# Patient Record
Sex: FEMALE | Race: WHITE | NOT HISPANIC OR LATINO | Employment: PART TIME | URBAN - METROPOLITAN AREA
[De-identification: names, ages, dates, MRNs, and addresses within clinical notes are randomized per-mention and may not be internally consistent; named-entity substitution may affect disease eponyms.]

---

## 2017-01-20 ENCOUNTER — ALLSCRIPTS OFFICE VISIT (OUTPATIENT)
Dept: OTHER | Facility: OTHER | Age: 47
End: 2017-01-20

## 2017-03-20 ENCOUNTER — ALLSCRIPTS OFFICE VISIT (OUTPATIENT)
Dept: OTHER | Facility: OTHER | Age: 47
End: 2017-03-20

## 2017-03-20 DIAGNOSIS — Z12.31 ENCOUNTER FOR SCREENING MAMMOGRAM FOR MALIGNANT NEOPLASM OF BREAST: ICD-10-CM

## 2017-07-17 ENCOUNTER — GENERIC CONVERSION - ENCOUNTER (OUTPATIENT)
Dept: OTHER | Facility: OTHER | Age: 47
End: 2017-07-17

## 2017-08-09 ENCOUNTER — ALLSCRIPTS OFFICE VISIT (OUTPATIENT)
Dept: OTHER | Facility: OTHER | Age: 47
End: 2017-08-09

## 2018-01-11 NOTE — MISCELLANEOUS
Message   Recorded as Task   Date: 06/21/2016 01:13 PM, Created By: Kay Uribe   Task Name: Call Back   Assigned To:  Paulette Gant   Regarding Patient: Vianne Claude, Status: Active   CommentLeslee Mix - 21 Jun 2016 1:13 PM     TASK CREATED  Caller: Self; [phone] (Home)  call re decreasing meds not home til after 230pm   Pt has bee doing well with reducing prozac to 60 mg and questioned further reduction- will decrease to 40 mg alt with 60 mg qod until her appt      Plan  Body dysmorphic disorder, OCD (obsessive compulsive disorder)    · FLUoxetine HCl - 40 MG Oral Capsule  OCD (obsessive compulsive disorder)    · FLUoxetine HCl - 20 MG Oral Capsule; 3 caps po qd    Signatures   Electronically signed by : DANIELLE Ruiz; Jun 22 2016  4:16PM EST                       (Author)

## 2018-01-11 NOTE — PSYCH
Psych Med Mgmt    Appearance: was calm and cooperative  Observed mood: mood appropriate  Observed mood: affect appropriate  Speech: a normal rate  Thought processes: coherent/organized  Hallucinations: no hallucinations present  Thought Content: no delusions  Abnormal Thoughts: The patient has no suicidal thoughts and no homicidal thoughts  Orientation: The patient is oriented to person, place and time  Recent and Remote Memory: short term memory intact and long term memory intact  Attention Span And Concentration: concentration intact  Insight: Insight intact  Judgment: Her judgment was intact  Muscle Strength And Tone  Normal gait and station  Treatment Recommendations: She has been taking fluoxetine 40 mg qod alt with 60 mg qod    Will reduce fluoxetine 40 mg po qd    Discussed concerns of relapse  Risks, Benefits And Possible Side Effects Of Medications: Risks, benefits, and possible side effects of medications explained to patient and patient verbalizes understanding  She reports normal appetite, normal energy level, no weight change and normal number of sleep hours  Pt seen for follow up- pt states she has been feeling fairly stable  She has some increase stressors related to 2 children going to college and their youngest child is now 12  She states her anxiety has been stable- no depression  She sleeps well- appetite is good  She has been exercising regularly  Vitals  Signs   Recorded: [id number] 01:51PM   Respiration: 16  Height: 5 ft 5 5 in  Weight: 120 lb   BMI Calculated: 19 67  BSA Calculated: 1 6    DSM    Provisional Diagnosis: BDD  OCD  Assessment    1  OCD (obsessive compulsive disorder) (300 3) (F42)   2  Body dysmorphic disorder (300 7) (F45 22)    Plan    1   From  FLUoxetine HCl - 20 MG Oral Capsule 3 caps po qd To FLUoxetine HCl   - 40 MG Oral Capsule 1 CAPS PO qd    Review of Systems    Constitutional: No fever, no chills, feels well, no tiredness, no recent weight gain or loss  Cardiovascular: no complaints of slow or fast heart rate, no chest pain, no palpitations  Respiratory: no complaints of shortness of breath, no wheezing, no dyspnea on exertion  Gastrointestinal: no complaints of abdominal pain, no constipation, no nausea, no diarrhea, no vomiting  Genitourinary: no complaints of dysuria, no incontinence, no pelvic pain, no urinary frequency  Musculoskeletal: no complaints of arthralgia, no myalgias, no limb pain, no joint stiffness  Integumentary: no complaints of skin rash, no itching, no dry skin  Neurological: no complaints of headache, no confusion, no numbness, no dizziness  Active Problems    1  Body dysmorphic disorder (300 7) (F45 22)   2  Dietary calcium deficiency (269 3) (E58)   3  OCD (obsessive compulsive disorder) (300 3) (F42)   4  Pap smear for cervical cancer screening (V76 2) (Z12 4)   5  Visit for screening mammogram (V76 12) (Z12 31)   6  Well female exam with routine gynecological exam ( 31) (Z01 419)    Past Medical History    1  History of Depression with anxiety (300 4) (F41 8)   2  Denied: History of abnormal cervical Pap smear   3  History of chickenpox (V12 09) (Z86 19)   4  Denied: History of herpes simplex infection   5  History of pregnancy (V13 29)   6  History of Menarche (V21 8)    The active problems and past medical history were reviewed and updated today  Allergies    1  Bactrim TABS    Current Meds   1  FLUoxetine HCl - 20 MG Oral Capsule; 3 caps po qd; Therapy: [id number] to (Last Rx:2016)  Requested for: 2016 Ordered    The medication list was reviewed and updated today  Family Psych History  Mother    1  Family history of    2  Family history of breast cancer (V16 3) (Z80 3)   3  Family history of hypertension (V17 49) (Z82 49)   4  Family history of type 2 diabetes mellitus (V18 0) (Z83 3)  Father    5   Family history of malignant neoplasm of urinary bladder (V16 52) (Z80 52)  Sister    6  Family history of hypertension (V17 49) (Z82 49)  Brother    7  Family history of congestive heart failure (V17 49) (Z82 49)   8  Family history of depression (V17 0) (Z81 8)   9  Family history of hypertension (V17 49) (Z82 49)  Maternal Grandmother    10  Family history of breast cancer (V16 3) (Z80 3)    The family history was reviewed and updated today  Social History    · Exercise habits   · High school graduate   · Denied: History of drug use   ·    · Never a smoker   · No alcohol use   · Occupation   · Mormon Affiliation  The social history was reviewed and updated today  The social history was reviewed and is unchanged  End of Encounter Meds    1  FLUoxetine HCl - 40 MG Oral Capsule; 1 CAPS PO qd;   Therapy: [id number] to (Last Rx:[id number])  Requested for: 89Wze5652 Ordered    Signatures   Electronically signed by : Oziel Silva, [address];  Aug  4 2016  1:54PM EST                       (Author)    Electronically signed by : Torrie Colon MD; Aug  9 2016  3:18PM EST

## 2018-01-12 NOTE — PSYCH
Psych Med Mgmt    Appearance: was calm and cooperative  Observed mood: mood appropriate  Observed mood: affect appropriate  Speech: a normal rate  Thought processes: coherent/organized  Hallucinations: no hallucinations present  Thought Content: no delusions  Abnormal Thoughts: The patient has no suicidal thoughts and no homicidal thoughts  Orientation: The patient is oriented to person, place and time  Recent and Remote Memory: short term memory intact and long term memory intact  Attention Span And Concentration: concentration intact  Insight: Insight intact  Judgment: Her judgment was intact  Muscle Strength And Tone  Normal gait and station  Treatment Recommendations: Decrease to Prozac 20 mg po qd  Risks, Benefits And Possible Side Effects Of Medications: Risks, benefits, and possible side effects of medications explained to patient and patient verbalizes understanding  She reports normal appetite, normal energy level, no weight change and normal number of sleep hours  Pt seen for follow up OD/ BDD  Pt states she has been doing well  She is working part time at [employer] and is doing well with that  She states her anxiety has been stable- no recent panic attacks  She is sleeping well and good appetite  She is exercising regularly about 45- 60 minutes per day  She is maintaining her weight  She has been doing well with her BDD- no exacerbations with reduction on Prozac  No new meds or medical issues  Her 2 children are home on college break  She states things have been going well at home  Her youngest is now 12years old  She is interested in reducing meds further but know she will be on long term  Vitals  Signs   Recorded: 20Jan2017 01:10PM   Heart Rate: 62  Respiration: 16  Systolic: 088  Diastolic: 64  Height: 5 ft 5 5 in  Weight: 120 lb   BMI Calculated: 19 67  BSA Calculated: 1 6    DSM    Provisional Diagnosis: OCD  BDD  Assessment    1   OCD (obsessive compulsive disorder) (300 3) (F42 9)   2  Body dysmorphic disorder (300 7) (F45 22)    Plan    1  From  FLUoxetine HCl - 20 MG Oral Capsule take 3 capsules daily To   FLUoxetine HCl - 20 MG Oral Capsule take 1capsule po  daily    Review of Systems    Constitutional: No fever, no chills, feels well, no tiredness, no recent weight gain or loss  Cardiovascular: no complaints of slow or fast heart rate, no chest pain, no palpitations  Respiratory: no complaints of shortness of breath, no wheezing, no dyspnea on exertion  Gastrointestinal: no complaints of abdominal pain, no constipation, no nausea, no diarrhea, no vomiting  Genitourinary: no complaints of dysuria, no incontinence, no pelvic pain, no urinary frequency  Musculoskeletal: no complaints of arthralgia, no myalgias, no limb pain, no joint stiffness  Integumentary: no complaints of skin rash, no itching, no dry skin  Neurological: no complaints of headache, no confusion, no numbness, no dizziness  Active Problems    1  Body dysmorphic disorder (300 7) (F45 22)   2  Dietary calcium deficiency (269 3) (E58)   3  OCD (obsessive compulsive disorder) (300 3) (F42 9)   4  Pap smear for cervical cancer screening (V76 2) (Z12 4)   5  Visit for screening mammogram (V76 12) (Z12 31)   6  Well female exam with routine gynecological exam (V7 31) (Z01 419)    Past Medical History    1  History of Depression with anxiety (300 4) (F41 8)   2  Denied: History of abnormal cervical Pap smear   3  History of chickenpox (V12 09) (Z86 19)   4  Denied: History of herpes simplex infection   5  History of pregnancy (V13 29)   6  History of Menarche (V21 8)    Allergies    1  Bactrim TABS    Current Meds    The medication list was reviewed and updated today  Family Psych History  Mother    1  Family history of    2  Family history of breast cancer (V16 3) (Z80 3)   3  Family history of hypertension (V17 49) (Z82 49)   4   Family history of type 2 diabetes mellitus (V18 0) (Z83 3)  Father    5  Family history of malignant neoplasm of urinary bladder (V16 52) (Z80 52)  Sister    6  Family history of hypertension (V17 49) (Z82 49)  Brother    7  Family history of congestive heart failure (V17 49) (Z82 49)   8  Family history of depression (V17 0) (Z81 8)   9  Family history of hypertension (V17 49) (Z82 49)  Maternal Grandmother    10  Family history of breast cancer (V16 3) (Z80 3)    The family history was reviewed and updated today  Social History    · Exercise habits   · High school graduate   · Denied: History of drug use   ·    · Never a smoker   · No alcohol use   · Occupation   · Hoahaoism Affiliation  The social history was reviewed and is unchanged  End of Encounter Meds    1  FLUoxetine HCl - 20 MG Oral Capsule; take 1capsule po  daily; Therapy: 01Sep2016 to (Last Rx:20Jan2017)  Requested for: 20Jan2017 Ordered    Future Appointments    Date/Time Provider Specialty Site   03/20/2017 01:00 PM ALICJA Lopez   Obstetrics/Gynecology [facility]   06/07/2017 01:00 PM Maxine Mendoza [facility] Psychiatry [address] [facility]     Signatures   Electronically signed by : Gilda Urena [facility]; Jan 20 2017  1:15PM EST                       (Author)    Electronically signed by : Gilda Urena [facility]; Jan 20 2017  1:16PM EST                       (Author)    Electronically signed by : Jazmine Oconnor MD; Jan 23 2017  4:44PM EST

## 2018-01-13 VITALS
HEART RATE: 62 BPM | WEIGHT: 120 LBS | SYSTOLIC BLOOD PRESSURE: 120 MMHG | RESPIRATION RATE: 16 BRPM | HEIGHT: 66 IN | DIASTOLIC BLOOD PRESSURE: 64 MMHG | BODY MASS INDEX: 19.29 KG/M2

## 2018-01-14 VITALS
DIASTOLIC BLOOD PRESSURE: 70 MMHG | HEIGHT: 65 IN | SYSTOLIC BLOOD PRESSURE: 120 MMHG | BODY MASS INDEX: 21.16 KG/M2 | WEIGHT: 127 LBS

## 2018-01-15 NOTE — PSYCH
Psych Med Mgmt    Appearance: was calm and cooperative  Observed mood: mood appropriate  Observed mood: affect appropriate  Speech: a normal rate  Thought processes: coherent/organized  Hallucinations: no hallucinations present  Thought Content: no delusions  Abnormal Thoughts: The patient has no suicidal thoughts and no homicidal thoughts  Orientation: The patient is oriented to person, place and time  Recent and Remote Memory: short term memory intact and long term memory intact  Attention Span And Concentration: concentration intact  Insight: Insight intact  Judgment: Her judgment was intact  Treatment Recommendations: Decrease Fluoxetine to 20 mg 3 caps po qd  Risks, Benefits And Possible Side Effects Of Medications: Risks, benefits, and possible side effects of medications explained to patient and patient verbalizes understanding  She reports normal appetite, normal energy level, no weight change and normal number of sleep hours  Pt has been stable- "busy" with work and housework  Frustrated because her children do not help much around the house  She has been working full time now at [employer] at 4 am and works til 200  She states her children are used to her being home and doing everything around the house  She does enjoy her job  She has fair motivation and states she is able to enjoy herself  Her brother has a third hip replacement and is recuperating from that  Vitals  Signs [Data Includes: Current Encounter]   Recorded: [id number] 02:14PM   Heart Rate: 60  Respiration: 16  Systolic: 749  Diastolic: 65  Height: 5 ft 5 5 in  Weight: 120 lb   BMI Calculated: 19 67  BSA Calculated: 1 6    DSM    Provisional Diagnosis: OCD  Body Dysmorphic disorder  Assessment    1  Body dysmorphic disorder (300 7) (F45 22)   2  OCD (obsessive compulsive disorder) (300 3) (F42)    Plan    1  FLUoxetine HCl - 40 MG Oral Capsule    2   FLUoxetine HCl - 20 MG Oral Capsule; 3 caps po qd    Review of Systems    Constitutional: No fever, no chills, feels well, no tiredness, no recent weight gain or loss  Cardiovascular: no complaints of slow or fast heart rate, no chest pain, no palpitations  Respiratory: no complaints of shortness of breath, no wheezing, no dyspnea on exertion  Gastrointestinal: no complaints of abdominal pain, no constipation, no nausea, no diarrhea, no vomiting  Genitourinary: no complaints of dysuria, no incontinence, no pelvic pain, no urinary frequency  Musculoskeletal: no complaints of arthralgia, no myalgias, no limb pain, no joint stiffness  Integumentary: no complaints of skin rash, no itching, no dry skin  Active Problems    1  Body dysmorphic disorder (300 7) (F45 22)   2  Dietary calcium deficiency (269 3) (E58)   3  OCD (obsessive compulsive disorder) (300 3) (F42)   4  Pap smear for cervical cancer screening (V76 2) (Z12 4)   5  Visit for screening mammogram (V7 12) (Z12 31)   6  Well female exam with routine gynecological exam (V7 31) (Z01 419)    Past Medical History    1  History of Depression with anxiety (300 4) (F41 8)   2  Denied: History of abnormal cervical Pap smear   3  History of chickenpox (V12 09) (Z86 19)   4  Denied: History of herpes simplex infection   5  History of pregnancy (V13 29)   6  History of Menarche (V21 8)    Allergies    1  Bactrim TABS    Current Meds   1  FLUoxetine HCl - 40 MG Oral Capsule; Take 2 capsules daily; Therapy: [id number] to (Last Rx:2015)  Requested for: 2015 Ordered    The medication list was reviewed and updated today  Family Psych History    1  Family history of    2  Family history of breast cancer (V16 3) (Z80 3)   3  Family history of hypertension (V17 49) (Z82 49)   4  Family history of type 2 diabetes mellitus (V18 0) (Z83 3)    5  Family history of malignant neoplasm of urinary bladder (V16 52) (Z80 52)    6   Family history of hypertension (V17 49) (Z82 49)    7  Family history of congestive heart failure (V17 49) (Z82 49)   8  Family history of depression (V17 0) (Z81 8)   9  Family history of hypertension (V17 49) (Z82 49)    10  Family history of breast cancer (V16 3) (Z80 3)    The family history was reviewed and updated today  Social History    · Exercise habits   · High school graduate   · Denied: History of drug use   ·    · Never a smoker   · No alcohol use   · Occupation   · Denominational Affiliation  The social history was reviewed and updated today  The social history was reviewed and is unchanged  End of Encounter Meds    1  FLUoxetine HCl - 20 MG Oral Capsule; 3 caps po qd;    Therapy: [id number] to (Last Rx:24Mar2016)  Requested for: 24Mar2016 Ordered    Future Appointments    Date/Time Provider Specialty Site   03/24/2016 02:00 PM Alejandro Roblero [address]     Signatures   Electronically signed by : Jose D Ibarra, [facility]; Mar 24 2016  2:19PM EST                       (Author)    Electronically signed by : Caridad Rick MD; Mar 28 2016  4:52PM EST

## 2018-01-16 NOTE — PSYCH
Psych Med Mgmt    Appearance: was calm and cooperative  Observed mood: mood appropriate  Observed mood: affect appropriate  Speech: a normal rate  Thought processes: coherent/organized  Hallucinations: no hallucinations present  Thought Content: no delusions  Abnormal Thoughts: The patient has no suicidal thoughts and no homicidal thoughts  Orientation: The patient is oriented to person, place and time  Recent and Remote Memory: short term memory intact and long term memory intact  Attention Span And Concentration: concentration intact  Insight: Insight intact  Judgment: Her judgment was intact  Muscle Strength And Tone  Normal gait and station  Treatment Recommendations: Fluoxetine 20 mg po qd    Encouraged to see PCP for routine blood work  Risks, Benefits And Possible Side Effects Of Medications: Risks, benefits, and possible side effects of medications explained to patient and patient verbalizes understanding  She reports normal appetite, normal energy level, no weight change and normal number of sleep hours  Pt seen for follow up OCD/BDD  Pt has been doing well  Her son got his 's license so that was exciting  Her oldest son got engaged  her daughter is at [employer]  She has a god relationship wit her children and her spouse  She works part time at [employer] 3 days a week and she enjoys it  Sleeping well about 6 hours at night- she works 5 am - 1 pm   Denies recent anxiety or depression  She states she has a good appetite  She works out most days and watches her diet  She states she is not near as self conscious as she was  She is able to approach people now and this was not good before due to concerns of her face and preoccupations with her face  Vitals  Signs   Recorded: 11Fwz0460 01:15PM   Respiration: 16  Height: 5 ft 5 in  Weight: 120 lb   BMI Calculated: 19 97  BSA Calculated: 1 59    DSM    Provisional Diagnosis: OCD  BDD  Assessment    1  OCD (obsessive compulsive disorder) (300 3) (F42 9)   2  Body dysmorphic disorder (300 7) (F45 22)    Review of Systems    Constitutional: No fever, no chills, feels well, no tiredness, no recent weight gain or loss  Cardiovascular: no complaints of slow or fast heart rate, no chest pain, no palpitations  Respiratory: no complaints of shortness of breath, no wheezing, no dyspnea on exertion  Gastrointestinal: no complaints of abdominal pain, no constipation, no nausea, no diarrhea, no vomiting  Genitourinary: no complaints of dysuria, no incontinence, no pelvic pain, no urinary frequency  Musculoskeletal: no complaints of arthralgia, no myalgias, no limb pain, no joint stiffness  Integumentary: no complaints of skin rash, no itching, no dry skin  Neurological: no complaints of headache, no confusion, no numbness, no dizziness  Active Problems    1  Body dysmorphic disorder (300 7) (F45 22)   2  Dietary calcium deficiency (269 3) (E58)   3  OCD (obsessive compulsive disorder) (300 3) (F42 9)   4  Pap smear for cervical cancer screening (V76 2) (Z12 4)   5  Visit for screening mammogram (V76 12) (Z12 31)   6  Well female exam with routine gynecological exam (V7 31) (Z01 419)    Past Medical History    1  History of Depression with anxiety (300 4) (F41 8)   2  Denied: History of abnormal cervical Pap smear   3  History of chickenpox (V12 09) (Z86 19)   4  Denied: History of herpes simplex infection   5  History of pregnancy (V13 29)   6  History of Menarche (V21 8)    Allergies    1  Bactrim TABS    Current Meds   1  FLUoxetine HCl - 20 MG Oral Capsule; take 1 capsule daily; Therapy: 41Fhx6072 to (Last FB:[id number])  Requested for: 20XFT2553 Ordered    The medication list was reviewed and updated today  Family Psych History  Mother    1  Family history of    2  Family history of breast cancer (V16 3) (Z80 3)   3  Family history of hypertension (V17 49) (Z82 49)   4   Family history of type 2 diabetes mellitus (V18 0) (Z83 3)  Father    5  Family history of malignant neoplasm of urinary bladder (V16 52) (Z80 52)  Sister    6  Family history of hypertension (V17 49) (Z82 49)  Brother    7  Family history of congestive heart failure (V17 49) (Z82 49)   8  Family history of depression (V17 0) (Z81 8)   9  Family history of hypertension (V17 49) (Z82 49)  Maternal Grandmother    10  Family history of breast cancer (V16 3) (Z80 3)    Social History    · Exercise habits   · High school graduate   · Denied: History of drug use   ·    · Never a smoker   · No alcohol use   · Occupation   · Gnosticist Affiliation  The social history was reviewed and is unchanged  End of Encounter Meds    1  FLUoxetine HCl - 20 MG Oral Capsule; take 1 capsule daily; Therapy: 85Fpk3713 to (Last Rx:27Ohz2651)  Requested for: 66UEM3272 Ordered    Signatures   Electronically signed by : Daphnie Treadwell [facility];  Aug  9 2017  1:34PM EST                       (Author)    Electronically signed by : Smooth Coto MD; Aug  9 2017  3:06PM EST

## 2018-01-22 VITALS — HEIGHT: 65 IN | RESPIRATION RATE: 16 BRPM | BODY MASS INDEX: 19.99 KG/M2 | WEIGHT: 120 LBS

## 2018-03-06 DIAGNOSIS — F32.9 MDD (MAJOR DEPRESSIVE DISORDER): Primary | ICD-10-CM

## 2018-03-14 RX ORDER — FLUOXETINE HYDROCHLORIDE 20 MG/1
CAPSULE ORAL
Qty: 90 CAPSULE | Refills: 0 | Status: SHIPPED | OUTPATIENT
Start: 2018-03-14 | End: 2019-11-14

## 2018-06-12 ENCOUNTER — ANNUAL EXAM (OUTPATIENT)
Dept: OBGYN CLINIC | Facility: CLINIC | Age: 48
End: 2018-06-12
Payer: COMMERCIAL

## 2018-06-12 VITALS
HEART RATE: 55 BPM | DIASTOLIC BLOOD PRESSURE: 72 MMHG | SYSTOLIC BLOOD PRESSURE: 116 MMHG | WEIGHT: 128.2 LBS | HEIGHT: 64 IN | BODY MASS INDEX: 21.89 KG/M2

## 2018-06-12 DIAGNOSIS — E58 DIETARY CALCIUM DEFICIENCY: ICD-10-CM

## 2018-06-12 DIAGNOSIS — F32.9 MDD (MAJOR DEPRESSIVE DISORDER): ICD-10-CM

## 2018-06-12 DIAGNOSIS — R53.83 OTHER FATIGUE: ICD-10-CM

## 2018-06-12 DIAGNOSIS — Z01.419 ENCOUNTER FOR ANNUAL ROUTINE GYNECOLOGICAL EXAMINATION: Primary | ICD-10-CM

## 2018-06-12 DIAGNOSIS — Z12.31 VISIT FOR SCREENING MAMMOGRAM: ICD-10-CM

## 2018-06-12 PROCEDURE — 99396 PREV VISIT EST AGE 40-64: CPT | Performed by: OBSTETRICS & GYNECOLOGY

## 2018-06-12 RX ORDER — FLUOXETINE HYDROCHLORIDE 20 MG/1
CAPSULE ORAL
Qty: 90 CAPSULE | Refills: 0 | OUTPATIENT
Start: 2018-06-12

## 2018-06-12 RX ORDER — FLUOXETINE 20 MG/1
TABLET, FILM COATED ORAL
COMMUNITY
Start: 2016-09-01 | End: 2018-06-12

## 2018-06-12 NOTE — PROGRESS NOTES
Pt is a 46 y o  Alvaro Rivas with Patient's last menstrual period was 2018 (exact date)  using VL for [facility] presents for preventive care  She notes the same partner since her last STI evaluation  In her lifetime she has been involved with 1 partner   Safe sexual practices (monogomy, condoms) are followed consistently  Pt reports increasing fatigue--will check TSH and CBC    · She does  feel safe in the relationship  She does feel safe in her home  · Her calcium intake encompasses greens (lashell, turnip, kale, etc) for a total of 1 servings daily on average  She does not take additional Vitamin D (MVI or supplement)  · She exercises 6-7 times per week  · Her menses occur irregularly (1-3 months) last 4-5 days and require regular pad every 3-4 hours  Menstrual History:  OB History      Para Term  AB Living    3 0 0     3    SAB TAB Ectopic Multiple Live Births                     Obstetric Comments    MENARCHE @ 13 YRS OLD  Irregular periods now, missed for 2 months 2018, restarted and is changing  Noticing menopausal changes -- mood swing, hot flashes, fatigue            Menarche age: 13  Patient's last menstrual period was 2018 (exact date)  Period Cycle (Days): 28  Period Duration (Days): 5-7  Period Pattern: (!) Irregular  Menstrual Flow: Moderate  Menstrual Control: Thin pad  ·      · She has never recieved an HPV vaccine  · tobacco use : does not use tobacco              · Last pap: --wnl, HPV neg, repeat 2019  · Mammogram 2017--wnl, repeat 2018  · Colonoscopy --wnl, repeat unsure (48 or 10 years)    Past Medical History:   Diagnosis Date    Anxiety and depression     Body dysmorphic disorder     Chickenpox     OCD (obsessive compulsive disorder)        Past Surgical History:   Procedure Laterality Date    MANDIBLE FRACTURE SURGERY      JAW RECONSTRUCTION TO EXPAND AND ALLOW ROOM FOR BRACES   [address]      's       OB History  Para Term  AB Living   3 0 0     3   SAB TAB Ectopic Multiple Live Births                  # Outcome Date GA Lbr Curt/2nd Weight Sex Delivery Anes PTL Lv   3       Vag-Spont      2       Vag-Spont      1       Vag-Spont         Obstetric Comments   MENARCHE @ 13 YRS OLD   Irregular periods now, missed for 2 months 2018, restarted and is changing   Noticing menopausal changes -- mood swing, hot flashes, fatigue        Gyn HX:  denies STD, abnormal pap, significant dysmenorrhea or irregular menses       Current Outpatient Prescriptions:     FLUoxetine (PROzac) 20 mg capsule, TAKE 1 CAPSULE DAILY, Disp: 90 capsule, Rfl: 0    Allergies   Allergen Reactions    Sulfamethoxazole-Trimethoprim Hives       Social History     Social History    Marital status: /Civil Union     Spouse name: N/A    Number of children: 3    Years of education: HIGH SCHOOL GRAD     Occupational History    [employer]      Social History Main Topics    Smoking status: Never Smoker    Smokeless tobacco: Never Used    Alcohol use No    Drug use: No    Sexual activity: Yes     Birth control/ protection: Male Sterilization      Comment: Vasectomy; lifetime parnters: 1     Other Topics Concern    None     Social History Narrative    Buddhist    EXERCISES 6-7 TIMES A WEEK - weight train, cardio         Calcium: 1 serving of either spinach/kale/fish daily     Vitamin D: no supplements            Family History   Problem Relation Age of Onset   Elizabeth Cortez Breast cancer Mother 76    Hypertension Mother     Diabetes type II Mother     Cancer Father      BLADDER    Hypertension Sister     Heart failure Brother     Depression Brother     Hypertension Brother     Breast cancer Maternal Grandmother 80       Blood pressure 116/72, pulse 55, height 5' 4 25" (1 632 m), weight 58 2 kg (128 lb 3 2 oz), last menstrual period 2018  and Body mass index is 21 83 kg/m²      Physical Exam   Constitutional: She appears well-developed and well-nourished  HENT:   Head: Normocephalic and atraumatic  Eyes: Conjunctivae and EOM are normal    Neck: Normal range of motion  Neck supple  No tracheal deviation present  No thyromegaly present  Cardiovascular: Normal rate, regular rhythm and normal heart sounds  Pulmonary/Chest: Effort normal and breath sounds normal  No stridor  No respiratory distress  She has no wheezes  She has no rales  Abdominal: Soft  Bowel sounds are normal  She exhibits no distension and no mass  There is no tenderness  There is no rebound and no guarding  Musculoskeletal: Normal range of motion  She exhibits no edema or tenderness  Lymphadenopathy:     She has no cervical adenopathy  Neurological: She is alert  Skin: Skin is warm  No rash noted  No erythema  Psychiatric: She has a normal mood and affect  Her behavior is normal  Judgment and thought content normal      Breasts: breasts appear normal, no suspicious masses, no skin or nipple changes or axillary nodes, symmetric fibrous changes in both upper outer quadrants, right breast normal without mass, skin or nipple changes or axillary nodes, left breast normal without mass, skin or nipple changes or axillary nodes  vulva: normal external genitalia for age and no lesions, masses, epithelial changes, or exudate  vagina: color pink, rugae  well formed rugae and bleeding  with a moderate amount of bleeding  cervix: parous and no lesions   uterus: NSSC, AF, NT, mobile  adnexa: no masses or tenderness  rectum: no masses or nodularity      A/P:  Pt is a 46 y o  Canda Bleacher with      Diagnoses and all orders for this visit:    Encounter for annual routine gynecological examination    Visit for screening mammogram  -     Mammo screening bilateral w cad; Future    Dietary calcium deficiency    Other fatigue  -     CBC; Future  -     TSH, 3rd generation with T4 reflex;  Future    Patient advised recommendation of daily dietary calcium of  1000 mg calcium  Patient advised recommendation of exercise 5 times per week for 30 minutes  Patient advised recommendation of BMI to be between 19-25

## 2018-06-15 LAB
BASOPHILS # BLD AUTO: 32 CELLS/UL (ref 0–200)
BASOPHILS NFR BLD AUTO: 0.8 %
EOSINOPHIL # BLD AUTO: 100 CELLS/UL (ref 15–500)
EOSINOPHIL NFR BLD AUTO: 2.5 %
ERYTHROCYTE [DISTWIDTH] IN BLOOD BY AUTOMATED COUNT: 11.8 % (ref 11–15)
HCT VFR BLD AUTO: 38.7 % (ref 35–45)
HGB BLD-MCNC: 12.7 G/DL (ref 11.7–15.5)
LYMPHOCYTES # BLD AUTO: 1248 CELLS/UL (ref 850–3900)
LYMPHOCYTES NFR BLD AUTO: 31.2 %
MCH RBC QN AUTO: 31.8 PG (ref 27–33)
MCHC RBC AUTO-ENTMCNC: 32.8 G/DL (ref 32–36)
MCV RBC AUTO: 97 FL (ref 80–100)
MONOCYTES # BLD AUTO: 452 CELLS/UL (ref 200–950)
MONOCYTES NFR BLD AUTO: 11.3 %
NEUTROPHILS # BLD AUTO: 2168 CELLS/UL (ref 1500–7800)
NEUTROPHILS NFR BLD AUTO: 54.2 %
PLATELET # BLD AUTO: 177 THOUSAND/UL (ref 140–400)
PMV BLD REES-ECKER: 10.5 FL (ref 7.5–12.5)
RBC # BLD AUTO: 3.99 MILLION/UL (ref 3.8–5.1)
TSH SERPL-ACNC: 4.44 MIU/L
WBC # BLD AUTO: 4 THOUSAND/UL (ref 3.8–10.8)

## 2019-11-14 ENCOUNTER — ANNUAL EXAM (OUTPATIENT)
Dept: OBGYN CLINIC | Facility: CLINIC | Age: 49
End: 2019-11-14
Payer: COMMERCIAL

## 2019-11-14 VITALS
SYSTOLIC BLOOD PRESSURE: 110 MMHG | BODY MASS INDEX: 20.79 KG/M2 | DIASTOLIC BLOOD PRESSURE: 70 MMHG | WEIGHT: 124.8 LBS | HEIGHT: 65 IN

## 2019-11-14 DIAGNOSIS — Z12.4 PAP SMEAR FOR CERVICAL CANCER SCREENING: ICD-10-CM

## 2019-11-14 DIAGNOSIS — E58 DIETARY CALCIUM DEFICIENCY: ICD-10-CM

## 2019-11-14 DIAGNOSIS — Z01.419 ENCOUNTER FOR ANNUAL ROUTINE GYNECOLOGICAL EXAMINATION: Primary | ICD-10-CM

## 2019-11-14 DIAGNOSIS — Z12.11 COLON CANCER SCREENING: ICD-10-CM

## 2019-11-14 DIAGNOSIS — Z12.31 VISIT FOR SCREENING MAMMOGRAM: ICD-10-CM

## 2019-11-14 PROBLEM — M26.609 TEMPOROMANDIBULAR JOINT DISORDERS: Status: ACTIVE | Noted: 2019-11-14

## 2019-11-14 PROBLEM — R53.83 OTHER FATIGUE: Status: RESOLVED | Noted: 2018-06-12 | Resolved: 2019-11-14

## 2019-11-14 PROCEDURE — G0145 SCR C/V CYTO,THINLAYER,RESCR: HCPCS | Performed by: PATHOLOGY

## 2019-11-14 PROCEDURE — 87624 HPV HI-RISK TYP POOLED RSLT: CPT | Performed by: OBSTETRICS & GYNECOLOGY

## 2019-11-14 PROCEDURE — 99396 PREV VISIT EST AGE 40-64: CPT | Performed by: OBSTETRICS & GYNECOLOGY

## 2019-11-14 PROCEDURE — G0124 SCREEN C/V THIN LAYER BY MD: HCPCS | Performed by: PATHOLOGY

## 2019-11-14 NOTE — PROGRESS NOTES
Pt is a 44 y o  Obinna Roberto with Patient's last menstrual period was 07/15/2019  using VL for [facility] presents for preventive care  She notes the same partner since her last STI evaluation  In her lifetime she has been involved with 1 partner   Safe sexual practices (monogomy, condoms) are followed consistently  · She does  feel safe in the relationship  She does feel safe in her home  · Her calcium intake encompasses calcium supplement and greens (lashell, turnip, kale, etc) for a total of 3-4 servings daily on average  She does take additional Vitamin D (MVI or supplement)  · She exercises 6-7 times per week  · Her menses has occurred 3x this year, last 4-5 days and require regular pad every 1-5 hours  Menstrual History:  OB History        3    Para   0    Term   0            AB        Living   3       SAB        TAB        Ectopic        Multiple        Live Births               Obstetric Comments   MENARCHE @ 13 YRS OLD  Menses: 3x in 2019, 5 days, maxipad every 1-5 hours  Noticing menopausal changes -- mood swing, hot flashes, fatigue               Menarche age: 13  Patient's last menstrual period was 07/15/2019  ·      · She has never recieved an HPV vaccine  · tobacco use : does not use tobacco              · Colonoscopy: -wnl, repeat recommend  by her physician, hemoccult  · Pap: 3/14/2016-wnl, HRHPV neg, repeat collected today  · Mammogram: 2019 3D-wnl; repeat rx given    Past Medical History:   Diagnosis Date    Anxiety and depression     Baker's cyst     Body dysmorphic disorder     Chickenpox     Depression 2015    OCD (obsessive compulsive disorder)     Other fatigue 2018       Past Surgical History:   Procedure Laterality Date    MANDIBLE FRACTURE SURGERY      JAW RECONSTRUCTION TO EXPAND AND ALLOW ROOM FOR BRACES       WISDOM TOOTH EXTRACTION             OB History    Para Term  AB Living   3 0 0     3   SAB TAB Ectopic Multiple Live Births                  # Outcome Date GA Lbr Curt/2nd Weight Sex Delivery Anes PTL Lv   3       Vag-Spont      2       Vag-Spont      1       Vag-Spont         Obstetric Comments   MENARCHE @ 13 YRS OLD   Menses: 3x in 2019, 5 days, maxipad every 1-5 hours   Noticing menopausal changes -- mood swing, hot flashes, fatigue           Current Outpatient Medications:     Calcium Carbonate-Vit D-Min (CALCIUM 1200 PO), Take by mouth, Disp: , Rfl:     VITAMIN D PO, Take by mouth, Disp: , Rfl:     Allergies   Allergen Reactions    Sulfamethoxazole-Trimethoprim Hives       Social History     Socioeconomic History    Marital status: /Civil Union     Spouse name: Jazlyn Petit Number of children: 3    Years of education: HIGH SCHOOL GRAD    Highest education level: High school graduate   Occupational History    Occupation: [employer]     Comment: 3x/week   Social Needs    Financial resource strain: None    Food insecurity:     Worry: None     Inability: None    Transportation needs:     Medical: None     Non-medical: None   Tobacco Use    Smoking status: Never Smoker    Smokeless tobacco: Never Used   Substance and Sexual Activity    Alcohol use: No    Drug use: No    Sexual activity: Yes     Partners: Male     Birth control/protection: Male Sterilization     Comment: Vasectomy; lifetime parnters: 1   Lifestyle    Physical activity:     Days per week: None     Minutes per session: None    Stress: None   Relationships    Social connections:     Talks on phone: None     Gets together: None     Attends Voodoo service: None     Active member of club or organization: None     Attends meetings of clubs or organizations: None     Relationship status: None    Intimate partner violence:     Fear of current or ex partner: None     Emotionally abused: None     Physically abused: None     Forced sexual activity: None   Other Topics Concern    None   Social History Narrative [employer]: [employer]    Exercise: 6-7 times/week - weight train, cardio         Calcium: 1 serving of either spinach/kale daily, 500 mg ca supplement daily    Vitamin D: supplement       Family History   Problem Relation Age of Onset   Shannan Askew Breast cancer Mother 76    Hypertension Mother     Diabetes type II Mother     Heart failure Mother     Cancer Father 46        BLADDER    Hypertension Sister     Heart failure Brother     Depression Brother     Hypertension Brother     Breast cancer Maternal Grandmother [de-identified]    Ovarian cancer Neg Hx     Colon cancer Neg Hx        Blood pressure 110/70, height 5' 5" (1 651 m), weight 56 6 kg (124 lb 12 8 oz), last menstrual period 07/15/2019  and Body mass index is 20 77 kg/m²  Physical Exam   Constitutional: She is oriented to person, place, and time  She appears well-developed and well-nourished  HENT:   Head: Normocephalic and atraumatic  Eyes: Conjunctivae and EOM are normal    Neck: Normal range of motion  Neck supple  No tracheal deviation present  No thyromegaly present  Cardiovascular: Normal rate, regular rhythm and normal heart sounds  Pulmonary/Chest: Effort normal and breath sounds normal  No stridor  No respiratory distress  She has no wheezes  She has no rales  Abdominal: Soft  Bowel sounds are normal  She exhibits no distension and no mass  There is no tenderness  There is no rebound and no guarding  Musculoskeletal: Normal range of motion  She exhibits no edema or tenderness  Lymphadenopathy:     She has no cervical adenopathy  Neurological: She is alert and oriented to person, place, and time  Skin: Skin is warm  No rash noted  No erythema  Psychiatric: She has a normal mood and affect   Her behavior is normal  Judgment and thought content normal      Breasts: breasts appear normal, no suspicious masses, no skin or nipple changes or axillary nodes, symmetric fibrous changes in both upper outer quadrants  vulva: normal external genitalia for age and no lesions, masses, epithelial changes, or exudate  vagina: color pink and rugae  well formed rugae  cervix: parous, no lesions  and pap obtained  uterus: NSSC, AF, NT, mobile  adnexa: no masses or tenderness  rectum: no masses or nodularity      A/P:  Pt is a 44 y o  Johanna Mates with      Diagnoses and all orders for this visit:    Encounter for annual routine gynecological examination    Visit for screening mammogram  -     Mammo screening bilateral w 3d & cad; Future    Colon cancer screening  -     Occult Blood, Fecal Immunochemical; Future    Pap smear for cervical cancer screening  -     Liquid-based pap, screening    Dietary calcium deficiency    Patient advised recommendation of daily dietary calcium of 1000 mg calcium

## 2019-11-15 LAB
HPV HR 12 DNA CVX QL NAA+PROBE: NEGATIVE
HPV16 DNA CVX QL NAA+PROBE: NEGATIVE
HPV18 DNA CVX QL NAA+PROBE: NEGATIVE

## 2019-11-21 LAB
LAB AP GYN PRIMARY INTERPRETATION: NORMAL
Lab: NORMAL
PATH INTERP SPEC-IMP: NORMAL

## 2020-08-05 DIAGNOSIS — Z12.31 VISIT FOR SCREENING MAMMOGRAM: ICD-10-CM
